# Patient Record
Sex: MALE | Race: BLACK OR AFRICAN AMERICAN
[De-identification: names, ages, dates, MRNs, and addresses within clinical notes are randomized per-mention and may not be internally consistent; named-entity substitution may affect disease eponyms.]

---

## 2018-06-18 NOTE — OR
DATE OF PROCEDURE:  06/15/2018

 

PREOPERATIVE DIAGNOSIS:  Morbid obesity.

 

POSTOPERATIVE DIAGNOSES:

1. Morbid obesity.

2. Marked hepatomegaly.

3. Paraesophageal diaphragmatic hernia.

4. Mediastinal lipoma.

 

OPERATIVE PROCEDURE:

1. Laparoscopic Jacob-en-Y gastric bypass along with gastroenterostomy (80730).

2. Hussein-Cut needle liver biopsy (35682).

3. Repair of paraesophageal diaphragmatic hernia (23463).

4. Excision of mediastinal lipoma (13608).

 

ANESTHESIA:  General.

 

ASSISTANTS:  Yanet Powell PA-C and MARILYN Greenberg

 

INDICATION FOR PROCEDURE:  This is a 62-year-old male presenting with longstanding morbid

obesity and increasingly significant comorbidities.  After preoperative evaluation and

discussion, he wished to proceed with a gastric bypass procedure.  Potential risks including

bleeding, infection, leaks from various GI tract closures, problems with bowel obstruction

over time as well as possibility of cardiopulmonary, septic, or hemorrhagic complications

leading to death were all discussed, and the patient wishes to proceed.

 

DETAILS OF PROCEDURE:  The patient was taken to the operating room.  After general

endotracheal anesthesia was induced, he was placed in a lithotomy position.  An orogastric

tube was placed and the abdomen prepped and draped.  At 15 cm inferior, 5 cm left of xiphoid

process, a transverse incision was made and the peritoneal cavity entered under direct

vision with an Optiview trocar inflated to 15 mmHg pressure with CO2.  Laparoscope was

reinserted.  No underlying trocar insertion site injuries were seen.  Following this, 5

additional trocars were placed across the upper mid abdomen and general exploration was

undertaken.  The patient was noted to have marked hepatomegaly with the liver volume being

roughly 2 to 3 times normal and grossly fatty infiltrated.  Hussein-Cut needle biopsies were

obtained and the bleeding easily controlled with electrocautery.  Using direct visualization

of the needle and the correct location, bilateral subcostal transverse abdominis plane

blocks were placed and attention was then taken to formation of the gastric bypass.

 

The omentum was then divided in the midline up to the level of the transverse colon.  This

allowed identification of the small bowel to the ligament of Treitz.  Small bowel was then

traced out 200 cm distal to that point, was divided transversely with a YESENIA stapler.  Small

bowel was then traced out an additional 200 cm where the side-to-side enteroenterostomy

accomplished with internal firing of the Endo-YESENIA 60 mm stapler.  The common opening was

then closed transversely with the same stapler and the angles anastomosed, and mesenteric

defect approximated with some 0 Ethibond sutures, reinforced with fibrin sealant.  The Jacob

limb was brought through an antecolic antegastric approach up to the level of the

gastroesophageal junction without tension.

 

The liver was then retracted anteriorly.  The patient was noted to have a moderate-sized

paraesophageal diaphragmatic hernia containing some perigastric fat and gastric fundus in

the plane anterior to the course of the remaining esophagus.  This was reduced and

peritoneum incised and reflected downward.  The patient was noted to have a mediastinal

lipoma in the plane of dissection, which was removed as well to facilitate a more

satisfactory closure and the anterior repair of the diaphragmatic hernia was then

accomplished with 0 Ethibond sutures reinforced with PTFE pledgets.

 

The gastrointestinal balloon catheter was then inflated to 15 mL and pulled up snugly

against the EG junction.  Gastric wall over the apex balloon was then marked with

electrocautery, and balloon catheter deflated and pulled up from the esophagus.  The lesser

omental tissue adjacent to the gastric cardia was incised following dissection behind the

stomach at the level of the cauterized nehemiah in the gastric cardia.  Pouch formation was

initiated with a transverse firing of the YESENIA black load, the second firing was noted up to

and through the angle of His for black loads, remaining firings up to and through the angle

of His for purple loads.  Upon completion of the pouch, both staple lines were noted to be

intact.  The anvil of the 25-mm EEA stapler was attached to a Wilson sump type tube, the

latter was brought down through the mouth and taken out through a small opening in the

gastric pouch allowing the anvil likewise to be pulled down to within the gastric pouch.

The divided end of the Jacob limb was then opened and the main body of the EEA stapler passed

several centimeters in the lumen of the small bowel, brought up the anvil, united with it,

thus creating a gastrojejunostomy.  Upon removal of the stapler, double donuts of mucosa

were noted within it.  The small bowel was closed off with a vascular staple line.

Gastrojejunostomy was reinforced with some 3-0 Vicryl seromuscular stitch along with fibrin

sealant.  Leak test was accomplished with injection of 120 mL of air in the gastric pouch

while submerged in cefoxitin-containing saline.  No leaks were identified.  A single Boone-

Foote drain was taken out through the left lateral trocar site and positioned adjacent to

the gastric cardia and the remaining trocars were removed and peritoneal cavity deflated.

Incisions were closed with 4-0 Vicryl skin stitch, which was also used to fix the drain.

The patient was taken to the recovery room in satisfactory condition.  There were no evident

complications.

 

Physician assistant, Yanet Powell, played an essential role in assisting in this case,

helping to position the patient, retract structures as needed, as well as suturing and

cutting sutures when indicated.  Her presence improved patient safety and decreased the

operative time.

 

 

 

 

Danis Gay MD

DD:  06/18/2018 13:17:31

DT:  06/18/2018 16:03:18

Job #:  [id number]

## 2018-06-18 NOTE — DISCH
ADMISSION DIAGNOSES:

1. Morbid obesity.

2. Unspecified polyarthropathy and polyarthritis.

3. Hidradenitis.

4. Adjustment disorder with mixed anxiety and depressed mood.

5. Chronic alcohol abuse, in remission.

6. Personality disorder.

7. Essential hypertension.

8. Nocturia.

9. Vitamin D deficiency.

10.Obstructive sleep apnea "treatment.".

 

DISCHARGE DIAGNOSES:  Laparoscopic gastric bypass surgery, liver biopsy, repair of

diaphragmatic hernia, and excision of mediastinal lipoma for morbid obesity, hepatomegaly,

diaphragmatic hernia, and mediastinal lipoma.  Date of surgery, 06/15/2018.

 

HISTORY:  Julio Medel is a 62-year-old male with longstanding history of morbid obesity and

increasing comorbidities.  After preoperative evaluation and discussion of possible risks

and possible complications, he wished to proceed with surgical procedure.

 

HOSPITAL COURSE:  Julio had his surgery on 06/15/2018.  He had no operative complications.

On postoperative day #1, he was started on step-2 gastric bypass diet without cereal and he

was started on his home medications.  On 06/17/2018, he started to have bloody stools.  His

CBC was checked, he was given factor VII, and they seemed to slow down.  On postoperative

day #3, he did have, prior to discharge, one large maroon stool.  Hemoglobin on the day of

discharge was 9.5 and hematocrit 30.3.  Vital signs were stable.  He received dietary

instruction.  He was able to be discharged to home.

 

PHYSICAL EXAMINATION:

GENERAL:  Julio Medel is a 62-year-old male.

VITAL SIGNS:  Height is 5 feet 7.5 inches.  Weight is 357 pounds.  BMI is 55.  TPR 96.1, 91,

18, and blood pressure 129/73.

HEENT:  Negative.

NECK:  Supple.

HEART:  Regular rate and rhythm.

LUNGS:  Clear.

ABDOMEN:  Sutures intact.  EVGENY drain removed.  Abdominal binder is on.

EXTREMITIES:  Revealed bilateral peripheral edema.

 

DISPOSITION:  Discharged to home.

 

CONDITION:  Stable and improving.

 

FOLLOWUP APPOINTMENT:  Yanet Powell PA-C, on 06/22/2018 at 10 a.m.  Hemoglobin at 9:30

before appointment.

 

DISCHARGE MEDICATIONS:  Home Medications:

1. Tylenol 650 mg q.6 hours.

2. Discontinue Celebrex.

3. Albuterol inhaler 2 puffs 4 times a day.

4. __________ 10 mg oral daily.

5. Avodart 0.5 mg oral daily.

6. Lasix 80 mg oral daily p.r.n. for edema.

7. Gabapentin 600 three times a day.

8. Lorcet 10/650, take one every 4 hours p.r.n. pain.

9. Cozaar, losartan, 50 mg oral daily.

10.Metoprolol- mg daily.

11.Viagra 100 mg p.r.n.

12.VESIcare 10 mg oral daily.

13.Triamcinolone 0.1% cream use 3 times a day to affected area.

14.Bupropion 100 mg oral daily.

15.Benadryl 50 mg oral q.6 hours.

16.Discontinue taking tramadol while on Norco, vitamin D3, and Celebrex.

 

DISCHARGE DIET:  Step-2 gastric bypass diet with no cereal until 07/02/2018.

 

ACTIVITY:  No lifting greater than 10 pounds for 2 weeks and then as tolerated.  Other

activity, walk 6 times daily inside your house.  Driving after discharge, do not drive for 2

weeks.  Shower/bathing, may shower.

 

DISCHARGE INSTRUCTIONS:  Notify provider of fever, nausea, or vomiting.  Keep site clean and

dry.  Use incentive spirometer 10 times every hour while awake.

## 2018-06-18 NOTE — CR
UGI wo KUB

 

HISTORY: eval R -Y GBP 

 

FINDINGS: After administration of oral contrast, upright views were obtained. Post operative changes 
gastric bypass. Surgical drains in place. No evidence for leak. There is some distention of small bow
el in the left abdomen. This may represent postop ileus.

 

 

IMPRESSION: No evidence for leak 

 Small bowel distention may represent ileus. Short-term follow-up recommended

## 2018-06-18 NOTE — PN
DATE OF SERVICE:  06/16/2018

 

The patient has been afebrile with stable vital signs.  No major problems noted overnight.

Oral intake was fairly good.  His urination appears to be satisfactory.  Upper GI shows a

little bit slow flow through the small bowel, but otherwise no problems were noted.  We will

go up to step-2 diet without solids today.  Restart his pertinent oral medications with some

additional inhalers, maximizing activity, and work with pulmonary toilet.

 

 

 

 

Danis Gay MD

DD:  06/16/2018 07:41:22

DT:  06/17/2018 13:07:02

Job #:  [id number]

## 2018-06-18 NOTE — PN
DATE OF SERVICE:  06/17/2018

 

The patient has been overall stable with relatively good oral intake.  He did have some

bloody bowel movements this morning.  I suspect this may be some old blood that occurred

immediately postoperatively.  He ___________ about the same, and his heart rates are in the

109 range, but that is what it has been running more or less throughout the entire

postoperative period.  We did give him a dose of activated factor VII, and we will recheck

the hemoglobin at 1400 hours, with the hemoglobin this morning being 10.5.  Heparin will be

held as well.  Otherwise, assuming that no further major problems occur, he will likely be

ready for discharge home tomorrow.  It would be good to have Dietary review the

postoperative gastric bypass diet regimen with the patient, as well as his attendant, who is

Eve Helm.

 

 

 

 

Danis Gay MD

DD:  06/17/2018 07:58:12

DT:  06/17/2018 12:07:17

Job #:  [id number]

## 2019-07-29 NOTE — CRLCT
INDICATION: Head trauma



TECHNIQUE: CT Head without i.v. contrast.



COMPARISON: 6/30/16



FINDINGS: 



CSF space: The ventricles are normal for age. 



Brain: No evidence of mass, acute infarction or hemorrhage is seen. No 

mass-effect or midline shift is seen. The brain parenchyma is otherwise 

normal in appearance with preservation of the gray-white matter junction. 



Calvarium: The visualized paranasal sinuses are well aerated. The mastoid 

air cells are clear. The visualized orbits are grossly unremarkable. The 

calvarium is unremarkable in appearance with no fractures identified. A 

moderate left occipital scalp hematoma is noted. 



IMPRESSION: 



1. No evidence of acute infarction, intracranial hemorrhage, or mass-effect 

seen. 



Please note that all CT scans at this facility use dose modulation, 

iterative reconstruction, and/or weight-based dosing when appropriate to 

reduce radiation dose to as low as reasonably achievable.



Dictated by: Omar Dong MD @ 07/29/2019 23:19:15



(Electronically Signed)

## 2020-04-26 ENCOUNTER — HOSPITAL ENCOUNTER (EMERGENCY)
Dept: HOSPITAL 11 - JP.ED | Age: 65
Discharge: HOME | End: 2020-04-26
Payer: MEDICARE

## 2020-04-26 VITALS — HEART RATE: 101 BPM | SYSTOLIC BLOOD PRESSURE: 156 MMHG | DIASTOLIC BLOOD PRESSURE: 86 MMHG

## 2020-04-26 DIAGNOSIS — I10: ICD-10-CM

## 2020-04-26 DIAGNOSIS — F17.210: ICD-10-CM

## 2020-04-26 DIAGNOSIS — K43.9: Primary | ICD-10-CM

## 2020-04-26 DIAGNOSIS — M19.90: ICD-10-CM

## 2020-04-26 DIAGNOSIS — Z79.899: ICD-10-CM

## 2020-04-26 DIAGNOSIS — E66.9: ICD-10-CM

## 2020-04-26 NOTE — EDM.PDOC
ED HPI GENERAL MEDICAL PROBLEM





- General


Chief Complaint: Abdominal Pain


Stated Complaint: ABDOMINAL PAIN


Time Seen by Provider: 04/26/20 20:25


Source of Information: Reports: Patient


History Limitations: Reports: No Limitations





- History of Present Illness


INITIAL COMMENTS - FREE TEXT/NARRATIVE: 





Pt arrived with a bulge just above the umbilicus. Pt was not able to reduce it. 

This did happen once in the past but it went back by itself. It had been out 

about 1.5 hours. This was very painful. c


Onset: Today, Sudden


Duration: Hour(s):


Location: Reports: Abdomen


Associated Symptoms: Reports: Other ( severe pain Pt had no vomiting. )


  ** Abdomen


Pain Score (Numeric/FACES): 10





- Related Data


 Allergies











Allergy/AdvReac Type Severity Reaction Status Date / Time


 


No Known Allergies Allergy   Verified 07/29/19 22:49











Home Meds: 


 Home Meds





Hydrocodone/Acetaminophen [Lorcet ] 10 - 325 mg PO Q4H PRN 03/17/14 [

History]


Sildenafil [Viagra] 100 mg PO BEDTIME PRN 03/17/14 [History]


Solifenacin [Vesicare] 10 mg PO DAILY 03/17/14 [History]


Triamcinolone Acetonide [Kenalog 0.1% Crm] 15 gm TRDERM TID 03/17/14 [History]


Furosemide [Lasix] 80 mg PO DAILY PRN 05/09/14 [History]


Gabapentin [Neurontin] 600 mg PO TID 06/30/16 [History]


Losartan [Cozaar] 50 mg PO DAILY 06/30/16 [History]


diphenhydrAMINE [Benadryl] 50 mg PO Q6H PRN 06/30/16 [History]


Albuterol Sulfate [Proair Hfa] 2 puff PO QID 06/14/18 [History]


Alfuzosin [Uroxatral] 10 mg PO DAILY 06/14/18 [History]


Metoprolol Succinate [Toprol XL 100mg] 100 mg PO DAILY 06/14/18 [History]


buPROPion [buPROPion XL] 150 mg PO DAILY 06/14/18 [History]


Celecoxib [CeleBREX] 200 mg PO DAILY@0800  cap 06/18/18 [Rx]


Potassium Chloride [Klor-Con] 40 meq PO BID 07/29/19 [History]


Valsartan 1 tab PO DAILY 07/29/19 [History]


traMADol HCl [Tramadol HCl] 1 tab PO Q6H PRN 07/29/19 [History]











Past Medical History


HEENT History: Reports: Impaired Vision, Other (See Below)


Other HEENT History: near sighted


Cardiovascular History: Reports: Hypertension


Respiratory History: Reports: SOB


Genitourinary History: Reports: Prostate Disorder


Musculoskeletal History: Reports: Arthritis, Neck Pain, Chronic, Other (See 

Below)


Other Musculoskeletal History: bilteral shoulder pain


Endocrine/Metabolic History: Reports: Obesity/BMI 30+





- Infectious Disease History


Infectious Disease History: Reports: Chicken Pox





- Past Surgical History


Cardiovascular Surgical History: Reports: None


Respiratory Surgical History: Reports: None


GI Surgical History: Reports: Bariatric Procedure, Hernia Repair/Other


Musculoskeletal Surgical History: Reports: Arthroscopic Knee





Social & Family History





- Tobacco Use


Smoking Status *Q: Current Every Day Smoker


Years of Tobacco use: 5


Packs/Tins Daily: 0.5





- Caffeine Use


Caffeine Use: Reports: None





- Alcohol Use


Date of Last Drink: 04/26/20





- Recreational Drug Use


Recreational Drug Use: No





ED ROS GENERAL





- Review of Systems


Review Of Systems: See Below


Constitutional: Reports: No Symptoms


HEENT: Reports: No Symptoms


Respiratory: Reports: No Symptoms


Cardiovascular: Reports: No Symptoms


Endocrine: Reports: No Symptoms


GI/Abdominal: Reports: Abdominal Pain, Other ( Pt has a bulge just above the 

umbilicus that would not go back in, This was very painful. )


: Reports: No Symptoms


Musculoskeletal: Reports: No Symptoms


Skin: Reports: No Symptoms





ED EXAM, GI/ABD





- Physical Exam


Exam: See Below


Text/Narrative:: 





pt arrived with pain in the abdoman and he had a definite bulge. With some 

pressure this was reduced.  His pain went away immediately. 


Exam Limited By: No Limitations


General Appearance: Alert, Anxious, Moderate Distress


Ears: Normal TMs


Nose: Normal Inspection


Throat/Mouth: Normal Inspection


Head: Atraumatic


Neck: Normal Inspection


Respiratory/Chest: No Respiratory Distress


Cardiovascular: Regular Rate, Rhythm


GI/Abdominal Exam: Other (pt had a ventral hernia above umbilcus This was 

pushed on and it did reduce. His pain did go away immediately. )


 (Male) Exam: Deferred


Rectal (Males) Exam: Deferred


Back Exam: Normal Inspection


Extremities: Normal Inspection


Neurological: Alert, Oriented, Normal Cognition





Course





- Vital Signs


Last Recorded V/S: 


 Last Vital Signs











Temp  36.3 C   04/26/20 19:49


 


Pulse  101 H  04/26/20 19:49


 


Resp  20   04/26/20 19:49


 


BP  156/86 H  04/26/20 19:49


 


Pulse Ox  100   04/26/20 19:49














- Re-Assessments/Exams


Free Text/Narrative Re-Assessment/Exam: 





04/26/20 20:32


umbilical hernia was reduced





Departure





- Departure


Time of Disposition: 20:27


Disposition: Home, Self-Care 01


Condition: Fair


Clinical Impression: 


 Ventral hernia








- Discharge Information


Instructions:  Hernia, Adult


Referrals: 


Fan Lundberg MD [Primary Care Provider] - 


Forms:  ED Department Discharge


Care Plan Goals: 


appt with Dr Gay in next 3 days to see if he feels this should  repaired. rtc 

if this should bulge out again. 





Sepsis Event Note





- Evaluation


Sepsis Screening Result: No Definite Risk





- Focused Exam


Vital Signs: 


 Vital Signs











  Temp Pulse Resp BP Pulse Ox


 


 04/26/20 19:49  36.3 C  101 H  20  156/86 H  100











Date Exam was Performed: 04/26/20


Time Exam was Performed: 20:29

## 2020-04-30 ENCOUNTER — HOSPITAL ENCOUNTER (OUTPATIENT)
Dept: HOSPITAL 11 - JP.SDS | Age: 65
LOS: 1 days | Discharge: HOME | End: 2020-05-01
Attending: SURGERY
Payer: MEDICARE

## 2020-04-30 DIAGNOSIS — G89.29: ICD-10-CM

## 2020-04-30 DIAGNOSIS — E66.9: ICD-10-CM

## 2020-04-30 DIAGNOSIS — N40.1: ICD-10-CM

## 2020-04-30 DIAGNOSIS — F41.9: ICD-10-CM

## 2020-04-30 DIAGNOSIS — I10: ICD-10-CM

## 2020-04-30 DIAGNOSIS — Z79.899: ICD-10-CM

## 2020-04-30 DIAGNOSIS — Z98.84: ICD-10-CM

## 2020-04-30 DIAGNOSIS — Z87.891: ICD-10-CM

## 2020-04-30 DIAGNOSIS — K43.6: Primary | ICD-10-CM

## 2020-04-30 DIAGNOSIS — M06.9: ICD-10-CM

## 2020-04-30 DIAGNOSIS — K42.9: ICD-10-CM

## 2020-04-30 DIAGNOSIS — F32.9: ICD-10-CM

## 2020-04-30 LAB — HBA1C BLD-MCNC: 5.9 % (ref 4.5–6.2)

## 2020-04-30 PROCEDURE — 51701 INSERT BLADDER CATHETER: CPT

## 2020-04-30 PROCEDURE — 94640 AIRWAY INHALATION TREATMENT: CPT

## 2020-04-30 PROCEDURE — C1713 ANCHOR/SCREW BN/BN,TIS/BN: HCPCS

## 2020-04-30 PROCEDURE — 83036 HEMOGLOBIN GLYCOSYLATED A1C: CPT

## 2020-04-30 PROCEDURE — 88302 TISSUE EXAM BY PATHOLOGIST: CPT

## 2020-04-30 PROCEDURE — 84100 ASSAY OF PHOSPHORUS: CPT

## 2020-04-30 PROCEDURE — 36415 COLL VENOUS BLD VENIPUNCTURE: CPT

## 2020-04-30 PROCEDURE — 49653: CPT

## 2020-04-30 PROCEDURE — 84630 ASSAY OF ZINC: CPT

## 2020-04-30 PROCEDURE — 82728 ASSAY OF FERRITIN: CPT

## 2020-04-30 PROCEDURE — 44700 SUSPEND BOWEL W/PROSTHESIS: CPT

## 2020-04-30 PROCEDURE — C1781 MESH (IMPLANTABLE): HCPCS

## 2020-04-30 PROCEDURE — 82746 ASSAY OF FOLIC ACID SERUM: CPT

## 2020-04-30 PROCEDURE — 94762 N-INVAS EAR/PLS OXIMTRY CONT: CPT

## 2020-04-30 PROCEDURE — 83735 ASSAY OF MAGNESIUM: CPT

## 2020-04-30 PROCEDURE — 80053 COMPREHEN METABOLIC PANEL: CPT

## 2020-04-30 PROCEDURE — 49652: CPT

## 2020-04-30 PROCEDURE — 84425 ASSAY OF VITAMIN B-1: CPT

## 2020-04-30 PROCEDURE — 82607 VITAMIN B-12: CPT

## 2020-04-30 PROCEDURE — 82525 ASSAY OF COPPER: CPT

## 2020-04-30 PROCEDURE — 84590 ASSAY OF VITAMIN A: CPT

## 2020-05-01 VITALS — DIASTOLIC BLOOD PRESSURE: 104 MMHG | SYSTOLIC BLOOD PRESSURE: 142 MMHG

## 2020-05-01 VITALS — HEART RATE: 85 BPM

## 2020-05-01 RX ADMIN — HYDROCODONE BITARTRATE AND ACETAMINOPHEN PRN TAB: 5; 325 TABLET ORAL at 13:11

## 2020-05-01 RX ADMIN — HYDROCODONE BITARTRATE AND ACETAMINOPHEN PRN TAB: 5; 325 TABLET ORAL at 08:17

## 2020-05-01 NOTE — PN
DATE OF SERVICE:  05/01/2020

 

SUBJECTIVE:  Julio is postoperative day #1.  He states his pain is controlled.  He has been

afebrile, up, ambulating.  He was not able to void postop.  He did have a straight cath with

400 mL and he has not voided since.  He plans to go home as soon as he can urinate.

 

REVIEW OF SYSTEMS:  Remainder of review of systems negative for any pertinent positives and

negatives.

 

OBJECTIVE:  GENERAL:  Julio Medel is a 64-year-old male.

VITAL SIGNS:  TPR at 07:03, 97.8; 83; 16; blood pressure 152/107.

HEENT:  Negative.

NECK:  Supple.

HEART:  Regular rate and rhythm.

LUNGS:  Clear.

ABDOMEN:  Dressings dry and intact.  Abdominal binder is on.

EXTREMITIES:  Without peripheral edema.

 

ASSESSMENT:  Laparoscopic repair of incarcerated umbilical hernia and epigastric hernia with

mesh for separate umbilical hernia and epigastric hernia.  Date of surgery:  04/30/2020.

Surgeon:  Dnais Gay MD.

 

PLAN:

1. Bladder scan now, then after voiding post bladder scan.  If 200 or below, may discharge

    to home.

2. We will evaluate p.r.n.

3. The patient is planning to be discharged today.

 

 

 

 

Yanet Powell PA-C

DD:  05/01/2020 07:53:24

DT:  05/01/2020 09:33:06

Job #:  [id number]

## 2020-05-10 NOTE — OR
DATE OF PROCEDURE:  04/30/2020

 

SURGEON:  Danis Gay MD

 

PREOPERATIVE DIAGNOSIS:  Incarcerated epigastric hernia.

 

POSTOPERATIVE DIAGNOSES:

1. Incarcerated epigastric hernia.

2. Separate non-incarcerated umbilical hernia.

 

OPERATIVE PROCEDURES:  Diagnostic laparoscopy with:

1. Repair of incarcerated epigastric hernia with mesh (44982).

2. Repair of separate non-incarcerated umbilical hernia with mesh (88876).

3. Placement of Interceed mesh x2 to limit adhesion formation between pelvic and abdominal

    wall and underlying viscera (72694).

 

ANESTHESIA:  General.

 

ASSISTANT:  Yanet Powell PA-C

 

INDICATION FOR PROCEDURE:  This is a 64-year-old male presenting with an incarcerated

epigastric hernia.  He was seen in the emergency room 2 days ago and at that time had quite

a bit of pain, and the hernia was partially reduced.  He still has some incarcerated

component within it.  Plan to proceed with diagnostic laparoscopy with laparotomy if

necessary and repair of this with mesh.  Potential risks including bleeding, infection,

injury to underlying viscera, problems with mesh becoming infected and the hernia recurring

were all reviewed, and the patient wishes to proceed.

 

DETAILS OF PROCEDURE:  The patient was taken to the operating room and placed in a supine

position after general endotracheal anesthesia was induced.  A Duffy catheter was inserted,

and the abdomen was prepped and draped.

 

In the left lateral abdomen, a transverse incision was made, and the peritoneal cavity

entered under direct vision with an Optiview trocar, inflated to 15 mmHg pressure with CO2.

Laparoscope was reinserted.  No underlying trocar insertion site injuries were seen.  5 mm

trocars were then placed in the left lower quadrant and left upper quadrant, and the abdomen

examined.  The patient was noted to have some incarcerated omentum within the epigastric

hernia site.  There was also a smaller non-incarcerated umbilical hernia somewhat below

that.  At this point, the hernia contents and the epigastric site were reduced with a

combination of external pressure and Harmonic scalpel dissection and that area was then

freed up of the hernia sac.  The abdomen was then marked out, and a Ventralight mesh with

balloon positioning system was then selected.  We elected to use two 15 cm meshes, one from

each hernia, which would provide a better coverage with less overall mesh on the inner

surface of the abdomen at each location, and the mesh was soaked in antibiotic-containing

saline solution, applied to the abdomen, and each site then sequentially pulled up,

inflated, and the mesh circumferentially affixed with absorbable tacking screws in each

case.  Then, the balloons were deflated and removed.  Good fixation was evident and there

was good coverage of the hernia defects at both locations.

 

To limit recurrent adhesion formation between the mesh and the remainder of the abdominal

and pelvic wall, two Interceed meshes were then placed underneath the mesh and extending

down onto the pelvic wall.  Once these were in place, the trocars were removed.  Fascia at

the 12 mm site was closed with 0 Vicryl stitch and the skin with 4-0 Vicryl skin stitch.

Dressing was applied.  The patient was taken to the recovery room in satisfactory condition.

 

Physician assistant, Yanet Powell PA-C, played an essential role in assisting in this case,

helping to position the patient, and retract structures as needed as well as suturing and

cutting sutures when indicated.  Her presence improved patient safety and decreased the

operative time.

 

 

 

 

Danis Gay MD

DD:  05/09/2020 19:02:17

DT:  05/10/2020 11:01:36

Job #:  [id number]

## 2020-10-15 ENCOUNTER — HOSPITAL ENCOUNTER (EMERGENCY)
Dept: HOSPITAL 11 - JP.ED | Age: 65
Discharge: HOME | End: 2020-10-15
Payer: MEDICARE

## 2020-10-15 VITALS — HEART RATE: 58 BPM | DIASTOLIC BLOOD PRESSURE: 50 MMHG | SYSTOLIC BLOOD PRESSURE: 89 MMHG

## 2020-10-15 DIAGNOSIS — I11.0: Primary | ICD-10-CM

## 2020-10-15 DIAGNOSIS — F43.23: ICD-10-CM

## 2020-10-15 DIAGNOSIS — H55.89: ICD-10-CM

## 2020-10-15 DIAGNOSIS — F60.9: ICD-10-CM

## 2020-10-15 DIAGNOSIS — I48.11: ICD-10-CM

## 2020-10-15 DIAGNOSIS — Z79.899: ICD-10-CM

## 2020-10-15 DIAGNOSIS — I95.2: ICD-10-CM

## 2020-10-15 DIAGNOSIS — I50.810: ICD-10-CM

## 2020-10-15 DIAGNOSIS — E66.9: ICD-10-CM

## 2020-10-15 DIAGNOSIS — Z98.890: ICD-10-CM

## 2020-10-15 DIAGNOSIS — E83.51: ICD-10-CM

## 2020-10-15 PROCEDURE — 83880 ASSAY OF NATRIURETIC PEPTIDE: CPT

## 2020-10-15 PROCEDURE — 96365 THER/PROPH/DIAG IV INF INIT: CPT

## 2020-10-15 PROCEDURE — 85610 PROTHROMBIN TIME: CPT

## 2020-10-15 PROCEDURE — 70450 CT HEAD/BRAIN W/O DYE: CPT

## 2020-10-15 PROCEDURE — 84484 ASSAY OF TROPONIN QUANT: CPT

## 2020-10-15 PROCEDURE — 93005 ELECTROCARDIOGRAM TRACING: CPT

## 2020-10-15 PROCEDURE — 80307 DRUG TEST PRSMV CHEM ANLYZR: CPT

## 2020-10-15 PROCEDURE — 80053 COMPREHEN METABOLIC PANEL: CPT

## 2020-10-15 PROCEDURE — 99285 EMERGENCY DEPT VISIT HI MDM: CPT

## 2020-10-15 PROCEDURE — 36415 COLL VENOUS BLD VENIPUNCTURE: CPT

## 2020-10-15 PROCEDURE — 85025 COMPLETE CBC W/AUTO DIFF WBC: CPT

## 2020-10-15 PROCEDURE — 85730 THROMBOPLASTIN TIME PARTIAL: CPT

## 2020-10-15 PROCEDURE — 83605 ASSAY OF LACTIC ACID: CPT

## 2020-10-15 NOTE — EDM.PDOC
ED HPI GENERAL MEDICAL PROBLEM





- General


Chief Complaint: Neurological Problem


Stated Complaint: MEDICAL VIA NORTH


Time Seen by Provider: 10/15/20 02:15


Source of Information: Reports: EMS


History Limitations: Reports: Altered Mental Status





- History of Present Illness


INITIAL COMMENTS - FREE TEXT/NARRATIVE: 


Julio is a 65-year-old male presenting to the ED with unknown circumstances but

apparently was found unresponsive at his apartment.  It is unknown who contacted

911, however, when EMS arrived at his apartment they found him to be minimally 

responsive but combative when they tried to move him.  The patient was also 

found to be hypotensive but had a normal blood glucose.  The patient had a 

similar episode in 2019 where he presented hypotensive, altered mental status, 

in atrial fibrillation after having used methamphetamine.  The patient has 

remained hypotensive in the emergency room prompting a rapid assessment and rule

out of an intracranial bleed.





Duration: Other (Unknown)





- Related Data


                                    Allergies











Allergy/AdvReac Type Severity Reaction Status Date / Time


 


No Known Allergies Allergy   Verified 10/15/20 02:44











Home Meds: 


                                    Home Meds





Hydrocodone/Acetaminophen [Lorcet ] 10 - 325 mg PO Q4H PRN 03/17/14 

[History]


Sildenafil [Viagra] 100 mg PO BEDTIME PRN 03/17/14 [History]


Solifenacin [Vesicare] 10 mg PO DAILY 03/17/14 [History]


Triamcinolone Acetonide [Kenalog 0.1% Crm] 15 gm TRDERM TID 03/17/14 [History]


Gabapentin [Neurontin] 600 mg PO TID 06/30/16 [History]


Losartan [Cozaar] 50 mg PO DAILY 06/30/16 [History]


Albuterol Sulfate [Proair Hfa] 2 puff PO QID 06/14/18 [History]


Alfuzosin [Uroxatral] 10 mg PO DAILY 06/14/18 [History]


Metoprolol Succinate [Toprol XL 100mg] 100 mg PO DAILY 06/14/18 [History]


Celecoxib [CeleBREX] 200 mg PO DAILY@0800  cap 06/18/18 [Rx]


Potassium Chloride [Klor-Con] 20 meq PO BID 07/29/19 [History]


Valsartan 1 tab PO DAILY 07/29/19 [History]


traMADol HCl [Tramadol HCl] 1 tab PO Q6H PRN 07/29/19 [History]


Calcium Citrate/Vitamin D3 [Calcium Citrate + D] 2 each PO DAILY 04/28/20 

[History]


Cholecalciferol (Vitamin D3) [Vitamin D] 5,000 unit PO DAILY 04/28/20 [History]


Dutasteride [Avodart] 0.5 mg PO DAILY 04/28/20 [History]


Ferrous Fumarate/Vitamin C [Vitron-C] 1 tab PO DAILY 04/28/20 [History]


Folic Acid/Multivit-Min/Lutein [Multi-Vitamin Gummies] 1 each PO BID 04/28/20 

[History]


Furosemide [Lasix] 40 mg PO DAILY PRN 04/28/20 [History]


Thiamine Mononitrate (Vit B1) [Vitamin B-1] 100 mg PO DAILY 04/28/20 [History]


Vitamin A 10,000 unit PO DAILY 04/28/20 [History]











Past Medical History


HEENT History: Reports: Impaired Vision, Other (See Below)


Other HEENT History: near sighted


Cardiovascular History: Reports: Hypertension


Respiratory History: Reports: SOB


Genitourinary History: Reports: Prostate Disorder


Musculoskeletal History: Reports: Arthritis, Neck Pain, Chronic, Other (See 

Below)


Other Musculoskeletal History: bilteral shoulder pain


Endocrine/Metabolic History: Reports: Obesity/BMI 30+





- Infectious Disease History


Infectious Disease History: Reports: Chicken Pox, Measles





- Past Surgical History


Cardiovascular Surgical History: Reports: None


Respiratory Surgical History: Reports: None


GI Surgical History: Reports: Bariatric Procedure, Colostomy, Hernia 

Repair/Other


Musculoskeletal Surgical History: Reports: Arthroscopic Knee





Social & Family History





- Family History


Family Medical History: Noncontributory





- Caffeine Use


Caffeine Use: Reports: Soda





ED ROS GENERAL





- Review of Systems


Review Of Systems: Unable To Obtain (Patient unresponsive for the most part.  He

 only answers question when stimulated and only when he wants to.)


Reason Not Obtained: Altered mental status





- Physical Exam


Exam: See Below


Exam Limited By: Altered Mental Status


General Appearance: Obtunded


Eye Exam: Bilateral Eye: Abnormal EOM (Disconjugate gaze), PERRL (Pupils are 3 

to 4 mm and sluggish)


Throat/Mouth: Normal Inspection, Normal Lips, Normal Oropharynx


Head Exam: Atraumatic, Normocephalic


Neck: Normal Inspection, Non-Tender, Full Range of Motion


Respiratory/Chest: No Respiratory Distress, Lungs Clear, Normal Breath Sounds, 

No Accessory Muscle Use, Chest Non-Tender


Cardiovascular: Normal Peripheral Pulses, No Edema, No JVD, No Murmur, No Rub, 

Irregularly Irregular


GI/Abdominal: Normal Bowel Sounds, Soft, Non-Tender, No Organomegaly, No 

Distention


Neuro Exam (Abbreviated): Inattentive, Confused, Slow to Respond


Back Exam: Normal Inspection, Full Range of Motion


Extremities: Normal Range of Motion, Non-Tender, Pedal Edema (3+ pedal edema 

bilaterally to the knees)


Skin Exam: Warm, Dry


  ** #1 Interpretation


EKG Date: 10/15/20


Time: 02:43


Rhythm: A-Fib


Rate (Beats/Min): 85 (55 to 85 bpm)


QRS: Normal


ST-T: Other (Nonspecific ST-T changes in the inferior lateral leads)





Course





- Vital Signs


Last Recorded V/S: 


                                Last Vital Signs











Temp  36.2 C   10/15/20 03:11


 


Pulse  66   10/15/20 06:11


 


Resp  14   10/15/20 06:11


 


BP  71/41 L  10/15/20 06:11


 


Pulse Ox  99   10/15/20 06:11














- Orders/Labs/Meds


Orders: 


                               Active Orders 24 hr











 Category Date Time Status


 


 EKG Documentation Completion [RC] ASDIRECTED Care  10/15/20 02:18 Active


 


 DRUG SCREEN, URINE [URCHEM] Stat Lab  10/15/20 02:15 Ordered


 


 UA W/MICROSCOPIC [URIN] Stat Lab  10/15/20 02:15 Ordered


 


 Sodium Chloride 0.9% [Normal Saline] 1,000 ml Med  10/15/20 03:30 Active





 IV ASDIRECTED   


 


 EKG 12 Lead [EK] Routine Ther  10/15/20 02:15 Ordered








                                Medication Orders





Sodium Chloride (Normal Saline)  1,000 mls @ 150 mls/hr IV ASDIRECTED YVES


   Last Admin: 10/15/20 03:31  Dose: 150 mls/hr


   Documented by: JEAN








Labs: 


                                Laboratory Tests











  10/15/20 10/15/20 10/15/20 Range/Units





  02:20 02:20 02:20 


 


WBC  6.1    (4.5-11.0)  K/uL


 


RBC  5.27    (4.30-5.90)  M/uL


 


Hgb  14.2    (12.0-15.0)  g/dL


 


Hct  44.0    (40.0-54.0)  %


 


MCV  84    (80-98)  fL


 


MCH  27    (27-31)  pg


 


MCHC  32    (32-36)  %


 


Plt Count  178    (150-400)  K/uL


 


Neut % (Auto)  65    (36-66)  %


 


Lymph % (Auto)  23 L    (24-44)  %


 


Mono % (Auto)  10 H    (2-6)  %


 


Eos % (Auto)  1 L    (2-4)  %


 


Baso % (Auto)  1    (0-1)  %


 


PT   11.4   (9.5-12.0)  sec


 


INR   1.05   (0.80-1.20)  


 


APTT   24.0 L   (27.0-36.0)  sec


 


Sodium    143  (140-148)  mmol/L


 


Potassium    4.4  (3.6-5.2)  mmol/L


 


Chloride    108  (100-108)  mmol/L


 


Carbon Dioxide    25  (21-32)  mmol/L


 


Anion Gap    9.7  (5.0-14.0)  mmol/L


 


BUN    19 H  (7-18)  mg/dL


 


Creatinine    1.4 H D  (0.8-1.3)  mg/dL


 


Est Cr Clr Drug Dosing    55.76  mL/min


 


Estimated GFR (MDRD)    > 60  (>60)  


 


Glucose    124 H  ()  mg/dL


 


Lactic Acid     (0.4-2.0)  mmol/L


 


Calcium    7.9 L  (8.5-10.1)  mg/dL


 


Total Bilirubin    0.3  (0.2-1.0)  mg/dL


 


AST    15  (15-37)  U/L


 


ALT    24  (12-78)  U/L


 


Alkaline Phosphatase    81  ()  U/L


 


Troponin I     (0.000-0.056)  ng/mL


 


NT-Pro-B Natriuret Pep     (5-125)  pg/mL


 


Total Protein    5.8 L  (6.4-8.2)  g/dL


 


Albumin    2.7 L  (3.4-5.0)  g/dL


 


Globulin    3.1  (2.3-3.5)  g/dL


 


Albumin/Globulin Ratio    0.9 L  (1.2-2.2)  


 


Ethyl Alcohol     mg/dL














  10/15/20 10/15/20 10/15/20 Range/Units





  02:20 02:20 02:20 


 


WBC     (4.5-11.0)  K/uL


 


RBC     (4.30-5.90)  M/uL


 


Hgb     (12.0-15.0)  g/dL


 


Hct     (40.0-54.0)  %


 


MCV     (80-98)  fL


 


MCH     (27-31)  pg


 


MCHC     (32-36)  %


 


Plt Count     (150-400)  K/uL


 


Neut % (Auto)     (36-66)  %


 


Lymph % (Auto)     (24-44)  %


 


Mono % (Auto)     (2-6)  %


 


Eos % (Auto)     (2-4)  %


 


Baso % (Auto)     (0-1)  %


 


PT     (9.5-12.0)  sec


 


INR     (0.80-1.20)  


 


APTT     (27.0-36.0)  sec


 


Sodium     (140-148)  mmol/L


 


Potassium     (3.6-5.2)  mmol/L


 


Chloride     (100-108)  mmol/L


 


Carbon Dioxide     (21-32)  mmol/L


 


Anion Gap     (5.0-14.0)  mmol/L


 


BUN     (7-18)  mg/dL


 


Creatinine     (0.8-1.3)  mg/dL


 


Est Cr Clr Drug Dosing     mL/min


 


Estimated GFR (MDRD)     (>60)  


 


Glucose     ()  mg/dL


 


Lactic Acid  2.0    (0.4-2.0)  mmol/L


 


Calcium     (8.5-10.1)  mg/dL


 


Total Bilirubin     (0.2-1.0)  mg/dL


 


AST     (15-37)  U/L


 


ALT     (12-78)  U/L


 


Alkaline Phosphatase     ()  U/L


 


Troponin I     (0.000-0.056)  ng/mL


 


NT-Pro-B Natriuret Pep    1477 H  (5-125)  pg/mL


 


Total Protein     (6.4-8.2)  g/dL


 


Albumin     (3.4-5.0)  g/dL


 


Globulin     (2.3-3.5)  g/dL


 


Albumin/Globulin Ratio     (1.2-2.2)  


 


Ethyl Alcohol   < 3   mg/dL














  10/15/20 Range/Units





  02:20 


 


WBC   (4.5-11.0)  K/uL


 


RBC   (4.30-5.90)  M/uL


 


Hgb   (12.0-15.0)  g/dL


 


Hct   (40.0-54.0)  %


 


MCV   (80-98)  fL


 


MCH   (27-31)  pg


 


MCHC   (32-36)  %


 


Plt Count   (150-400)  K/uL


 


Neut % (Auto)   (36-66)  %


 


Lymph % (Auto)   (24-44)  %


 


Mono % (Auto)   (2-6)  %


 


Eos % (Auto)   (2-4)  %


 


Baso % (Auto)   (0-1)  %


 


PT   (9.5-12.0)  sec


 


INR   (0.80-1.20)  


 


APTT   (27.0-36.0)  sec


 


Sodium   (140-148)  mmol/L


 


Potassium   (3.6-5.2)  mmol/L


 


Chloride   (100-108)  mmol/L


 


Carbon Dioxide   (21-32)  mmol/L


 


Anion Gap   (5.0-14.0)  mmol/L


 


BUN   (7-18)  mg/dL


 


Creatinine   (0.8-1.3)  mg/dL


 


Est Cr Clr Drug Dosing   mL/min


 


Estimated GFR (MDRD)   (>60)  


 


Glucose   ()  mg/dL


 


Lactic Acid   (0.4-2.0)  mmol/L


 


Calcium   (8.5-10.1)  mg/dL


 


Total Bilirubin   (0.2-1.0)  mg/dL


 


AST   (15-37)  U/L


 


ALT   (12-78)  U/L


 


Alkaline Phosphatase   ()  U/L


 


Troponin I  < 0.017  (0.000-0.056)  ng/mL


 


NT-Pro-B Natriuret Pep   (5-125)  pg/mL


 


Total Protein   (6.4-8.2)  g/dL


 


Albumin   (3.4-5.0)  g/dL


 


Globulin   (2.3-3.5)  g/dL


 


Albumin/Globulin Ratio   (1.2-2.2)  


 


Ethyl Alcohol   mg/dL











Meds: 


Medications











Generic Name Dose Route Start Last Admin





  Trade Name Freq  PRN Reason Stop Dose Admin


 


Sodium Chloride  1,000 mls @ 150 mls/hr  10/15/20 03:30  10/15/20 03:31





  Normal Saline  IV   150 mls/hr





  ASDIRECTED YVES   Administration














Discontinued Medications














Generic Name Dose Route Start Last Admin





  Trade Name Freq  PRN Reason Stop Dose Admin


 


Sodium Chloride  1,000 mls @ 1,000 mls/hr  10/15/20 03:24  10/15/20 02:29





  Normal Saline  IV  10/15/20 04:23  1,000 mls/hr





  .BOLUS ONE   Administration


 


Sodium Chloride  1,000 mls @ 1,000 mls/hr  10/15/20 03:25  10/15/20 02:45





  Normal Saline  IV  10/15/20 04:24  1,000 mls/hr





  .BOLUS ONE   Administration


 


Calcium Gluconate 1 gm/ Sodium  110 mls @ 100 mls/hr  10/15/20 04:37  10/15/20 

05:00





  Chloride  IV  10/15/20 05:42  100 mls/hr





  ONETIME ONE   Administration














- Radiology Interpretation


Free Text/Narrative:: 


No acute abnormalities seen on his CT of the brain without contrast.





CT Results Date: 10/15/20


CT Results Time: 02:27





- Re-Assessments/Exams


Free Text/Narrative Re-Assessment/Exam: 





10/15/20 04:10 the patient continued to have a tenuous blood pressure course 

despite being alert although not cooperative.  We had to repeatedly informed the

 patient that he was in the emergency room and that he needed to lie down as he 

was attempting to get off the bed with a blood pressure of 75/41.  The patient 

did receive several liters of IV normal saline with minimal response to his 

blood pressure.  He has not yet provided us with a urine sample to see what 

possibly he may be on as far as prescribed and nonprescribed drugs.  The patient

 did present to the ED with a very large pill bottle for Lortab 10 mg / 325 mg 

for 180 tablets that was dispensed on 9/4/2020.  It appears that he has been 

taking on average 8 tablets a day instead of the 6/day that he is prescribed.  

He may have taken of substantial quantity of this tonight causing his altered 

mental status.  We are still awaiting the patient to provide us with urine to be

 able to determine this.  Narcan was available at the scene by EMS but they did 

not administer it.  Based on the risks of Narcan administration in this context,

 I will hold off on it tonight unless he becomes tenuous with respiratory drive.

    





10/15/20 04:56 I did prescribe calcium gluconate 1 g IV for his serum calcium 

was 7.9.  His corrected calcium is 8.5 which is at the bottom abdomen normal so 

he would probably benefit him of IV calcium gluconate.





10/15/20 05:21 BNP is elevated at 1477 which accounts for his 3+ pitting edema. 

 He likely is in congestive heart failure secondary to atrial fibrillation as 

well as fluid retention.  Systemically, however, he appears to be dry with an 

elevated BUN and creatinine so I would be reluctant to put him on diuretics in 

his current state.





10/15/20 05:34 troponin I is normal at less than 0.017.  The patient is still 

not provided us with a urine for urinalysis and urine drug screen.  His blood 

pressure remains at 76/45 which is similar to when he presented on 7/19/2019.











10/15/20 06:54 the patient received the calcium gluconate 1 g with improvement 

in his blood pressure which is now 89/50.  His heart rate is still 65-72 in 

atrial fibrillation.  He still has not provided us any urine and likely will 

not.  At this time I feel that he is improved enough where he can be safely 

discharged into the care of another adult.  The patient's friend has been co

ntacted and is agreeable to keep an eye on him today.  Indications to return to 

the ED were discussed.








Departure





- Departure


Time of Disposition: 07:00


Disposition: Home, Self-Care 01


Condition: Fair


Clinical Impression: 


 Hypocalcemia, Dysconjugate gaze, Chronic prescription opiate use, Peripheral 

edema, Personality disorder, Status post gastric bypass for obesity, Status post

 repair of ventral hernia, Pain management contract agreement, Adjustment diso

rder with mixed anxiety and depressed mood





Altered mental status, unspecified


Qualifiers:


 Altered mental status type: unspecified Qualified Code(s): R41.82 - Altered 

mental status, unspecified





Hypotension


Qualifiers:


 Hypotension type: hypotension due to drug Qualified Code(s): I95.2 - 

Hypotension due to drugs





Atrial fibrillation


Qualifiers:


 Atrial fibrillation type: longstanding persistent Qualified Code(s): I48.11 - 

Longstanding persistent atrial fibrillation





Congestive heart failure (CHF)


Qualifiers:


 Heart failure type: right-sided Heart failure chronicity: unspecified Qualified

 Code(s): I50.810 - Right heart failure, unspecified








- Discharge Information


*PRESCRIPTION DRUG MONITORING PROGRAM REVIEWED*: Yes


*COPY OF PRESCRIPTION DRUG MONITORING REPORT IN PATIENT HENNA: No


Instructions:  Heart Failure, Self Care, Easy-to-Read, Hypotension, 

Easy-to-Read, Dehydration, Adult, Easy-to-Read, Heart Failure, Self Care, Atrial

 Fibrillation, Easy-to-Read, Hypotension


Referrals: 


PCP,None [Primary Care Provider] - 


Forms:  ED Department Discharge


Care Plan Goals: 


Please take your pain medication only as prescribed.  Overuse of the pain 

medication can severely affect your blood pressure, mentation, and can even be 

deadly.  It was noted that your prescription for Lortab 10 mg is missing quite a

few tablets indicating that there is significant overuse of this chronically 

prescribed medication.  This is likely what contributed to your unresponsiveness

that prompted you to be brought into the emergency room by EMS.





Sepsis Event Note (ED)





- Evaluation


Sepsis Screening Result: No Definite Risk





- Focused Exam


Vital Signs: 


                                   Vital Signs











  Temp Pulse Resp BP Pulse Ox


 


 10/15/20 06:11   66  14  71/41 L  99


 


 10/15/20 05:41   62  16  70/43 L  99


 


 10/15/20 05:07   67  15  76/45 L  99


 


 10/15/20 04:52   66  20  66/38 L  97


 


 10/15/20 04:37   75  16  80/51 L  97


 


 10/15/20 04:22   61  13  67/38 L 


 


 10/15/20 03:59   68  21 H  82/38 L  100


 


 10/15/20 03:54   74  14  76/44 L  99


 


 10/15/20 03:42   75  16  73/28 L 


 


 10/15/20 03:35   73  18  90/44 L 


 


 10/15/20 03:31   73  17  82/37 L 


 


 10/15/20 03:27   65  17  91/64 


 


 10/15/20 03:21   69  14  71/40 L  90 L


 


 10/15/20 03:16   70  14  75/48 L 


 


 10/15/20 03:11  36.2 C  60  21 H  68/41 L  94 L


 


 10/15/20 03:05   76  21 H  84/47 L 


 


 10/15/20 03:02   64  11 L  80/44 L 


 


 10/15/20 02:56   69  19  83/46 L  91 L


 


 10/15/20 02:51   68  19  79/46 L 


 


 10/15/20 02:44   69  18  74/47 L  97


 


 10/15/20 02:41   73  18  87/48 L  89 L


 


 10/15/20 02:39   69  17  87/48 L  92 L


 


 10/15/20 02:34   753 H  21 H  81/52 L  96


 


 10/15/20 02:29   73  19  72/44 L  96


 


 10/15/20 02:23  35.9 C L  64  24 H  68/45 L  94 L


 


 10/15/20 02:19   73  27 H  81/46 L  94 L


 


 10/15/20 02:13   72  20  82/55 L  93 L














- Problem List & Annotations


(1) Altered mental status, unspecified


SNOMED Code(s): 909990343


   Code(s): R41.82 - ALTERED MENTAL STATUS, UNSPECIFIED   Status: Acute   

Priority: High   Current Visit: Yes   


Qualifiers: 


   Altered mental status type: unspecified   Qualified Code(s): R41.82 - Altered

mental status, unspecified   





(2) Atrial fibrillation


SNOMED Code(s): 73370074


   Code(s): I48.91 - UNSPECIFIED ATRIAL FIBRILLATION   Status: Chronic   

Priority: Medium   Current Visit: Yes   


Qualifiers: 


   Atrial fibrillation type: longstanding persistent   Qualified Code(s): I48.11

- Longstanding persistent atrial fibrillation   





(3) Chronic prescription opiate use


SNOMED Code(s): 704612587


   Code(s): Z79.891 - LONG TERM (CURRENT) USE OF OPIATE ANALGESIC   Status: 

Chronic   Priority: High   Current Visit: Yes   





(4) Congestive heart failure (CHF)


SNOMED Code(s): 26060620


   Code(s): I50.9 - HEART FAILURE, UNSPECIFIED   Status: Chronic   Priority: 

Medium   Current Visit: Yes   


Qualifiers: 


   Heart failure type: right-sided   Heart failure chronicity: unspecified   

Qualified Code(s): I50.810 - Right heart failure, unspecified   





(5) Dysconjugate gaze


SNOMED Code(s): 685364047


   Code(s): H51.8 - OTHER SPECIFIED DISORDERS OF BINOCULAR MOVEMENT   Status: 

Chronic   Priority: Medium   Current Visit: Yes   





(6) Hypocalcemia


SNOMED Code(s): 5406152


   Code(s): E83.51 - HYPOCALCEMIA   Status: Acute   Priority: Medium   Current 

Visit: Yes   





(7) Hypotension


SNOMED Code(s): 20720966


   Code(s): I95.9 - HYPOTENSION, UNSPECIFIED   Status: Acute   Priority: Medium 

 Current Visit: Yes   


Qualifiers: 


   Hypotension type: hypotension due to drug   Qualified Code(s): I95.2 - 

Hypotension due to drugs   





(8) Peripheral edema


SNOMED Code(s): 705112519


   Code(s): R60.9 - EDEMA, UNSPECIFIED   Status: Chronic   Priority: Medium   

Current Visit: Yes   





- Problem List Review


Problem List Initiated/Reviewed/Updated: Yes





- My Orders


Last 24 Hours: 


My Active Orders





10/15/20 02:15


DRUG SCREEN, URINE [URCHEM] Stat 


UA W/MICROSCOPIC [URIN] Stat 


EKG 12 Lead [EK] Routine 





10/15/20 02:18


EKG Documentation Completion [RC] ASDIRECTED 





10/15/20 03:30


Sodium Chloride 0.9% [Normal Saline] 1,000 ml IV ASDIRECTED 














- Assessment/Plan


Last 24 Hours: 


My Active Orders





10/15/20 02:15


DRUG SCREEN, URINE [URCHEM] Stat 


UA W/MICROSCOPIC [URIN] Stat 


EKG 12 Lead [EK] Routine 





10/15/20 02:18


EKG Documentation Completion [RC] ASDIRECTED 





10/15/20 03:30


Sodium Chloride 0.9% [Normal Saline] 1,000 ml IV ASDIRECTED

## 2020-10-15 NOTE — CRLCT
INDICATION:



Altered mental status. Unconscious.



TECHNIQUE:



CT head without contrast.



COMPARISON:



July 29, 2019. 



FINDINGS:



CSF spaces: Within normal limits for age.  



Brain parenchyma and extra-axial spaces: The gray-white differentiation is 

normal.  No sign of mass, hemorrhage, or midline shift. No extra-axial 

fluid collection.  



Skull base and calvarium: The visualized paranasal sinuses and mastoid air 

cells demonstrate no acute or significant findings.  The visualized orbits 

are grossly unremarkable.  No skull fractures.  



IMPRESSION:



Unremarkable noncontrast head CT.



Dictated by Paco Muse MD @ 10/15/2020 2:57:46 AM



Please note that all CT scans at this facility use dose modulation, 

iterative reconstruction, and/or weight-based dosing when appropriate to 

reduce radiation dose to as low as reasonably achievable.



Dictated by: Paco Muse MD @ 10/15/2020 02:57:51



(Electronically Signed)

## 2022-04-07 ENCOUNTER — HOSPITAL ENCOUNTER (EMERGENCY)
Dept: HOSPITAL 11 - JP.ED | Age: 67
Discharge: HOME | End: 2022-04-07
Payer: MEDICARE

## 2022-04-07 VITALS — DIASTOLIC BLOOD PRESSURE: 97 MMHG | HEART RATE: 98 BPM | SYSTOLIC BLOOD PRESSURE: 139 MMHG

## 2022-04-07 DIAGNOSIS — Z79.899: ICD-10-CM

## 2022-04-07 DIAGNOSIS — I10: ICD-10-CM

## 2022-04-07 DIAGNOSIS — E66.9: ICD-10-CM

## 2022-04-07 DIAGNOSIS — R31.9: Primary | ICD-10-CM

## 2022-05-01 ENCOUNTER — HOSPITAL ENCOUNTER (EMERGENCY)
Dept: HOSPITAL 11 - JP.ED | Age: 67
Discharge: HOME | End: 2022-05-01
Payer: MEDICARE

## 2022-05-01 VITALS — HEART RATE: 93 BPM | SYSTOLIC BLOOD PRESSURE: 174 MMHG | DIASTOLIC BLOOD PRESSURE: 113 MMHG

## 2022-05-01 DIAGNOSIS — R10.84: Primary | ICD-10-CM

## 2022-05-01 DIAGNOSIS — Z79.899: ICD-10-CM

## 2022-05-01 DIAGNOSIS — Z72.0: ICD-10-CM

## 2022-05-01 DIAGNOSIS — R10.12: ICD-10-CM

## 2022-05-01 DIAGNOSIS — E66.9: ICD-10-CM

## 2022-05-01 DIAGNOSIS — I10: ICD-10-CM

## 2022-11-05 ENCOUNTER — HOSPITAL ENCOUNTER (EMERGENCY)
Dept: HOSPITAL 11 - JP.ED | Age: 67
Discharge: HOME | End: 2022-11-05
Payer: MEDICARE

## 2022-11-05 VITALS — HEART RATE: 85 BPM | SYSTOLIC BLOOD PRESSURE: 147 MMHG | DIASTOLIC BLOOD PRESSURE: 112 MMHG

## 2022-11-05 VITALS — DIASTOLIC BLOOD PRESSURE: 100 MMHG | SYSTOLIC BLOOD PRESSURE: 146 MMHG | HEART RATE: 130 BPM

## 2022-11-05 DIAGNOSIS — K58.9: Primary | ICD-10-CM

## 2022-11-05 DIAGNOSIS — Z79.899: ICD-10-CM

## 2022-11-05 DIAGNOSIS — E66.9: ICD-10-CM

## 2022-11-05 DIAGNOSIS — M19.90: ICD-10-CM

## 2022-11-05 DIAGNOSIS — I10: ICD-10-CM

## 2022-11-05 DIAGNOSIS — T83.098A: Primary | ICD-10-CM

## 2022-11-05 DIAGNOSIS — F17.210: ICD-10-CM

## 2022-11-05 DIAGNOSIS — Z72.0: ICD-10-CM

## 2022-11-05 PROCEDURE — 99283 EMERGENCY DEPT VISIT LOW MDM: CPT

## 2022-11-05 PROCEDURE — 96372 THER/PROPH/DIAG INJ SC/IM: CPT

## 2022-12-16 ENCOUNTER — HOSPITAL ENCOUNTER (EMERGENCY)
Dept: HOSPITAL 11 - JP.ED | Age: 67
Discharge: HOME | End: 2022-12-16
Payer: MEDICARE

## 2022-12-16 VITALS — SYSTOLIC BLOOD PRESSURE: 116 MMHG | DIASTOLIC BLOOD PRESSURE: 92 MMHG | HEART RATE: 91 BPM

## 2022-12-16 DIAGNOSIS — I50.9: ICD-10-CM

## 2022-12-16 DIAGNOSIS — I11.0: Primary | ICD-10-CM

## 2022-12-16 DIAGNOSIS — E66.9: ICD-10-CM

## 2022-12-16 DIAGNOSIS — Z79.899: ICD-10-CM

## 2022-12-16 PROCEDURE — 99284 EMERGENCY DEPT VISIT MOD MDM: CPT

## 2022-12-16 PROCEDURE — 85025 COMPLETE CBC W/AUTO DIFF WBC: CPT

## 2022-12-16 PROCEDURE — 36415 COLL VENOUS BLD VENIPUNCTURE: CPT

## 2022-12-16 PROCEDURE — 83880 ASSAY OF NATRIURETIC PEPTIDE: CPT

## 2022-12-16 PROCEDURE — 80048 BASIC METABOLIC PNL TOTAL CA: CPT

## 2023-03-13 ENCOUNTER — HOSPITAL ENCOUNTER (EMERGENCY)
Dept: HOSPITAL 11 - JP.ED | Age: 68
LOS: 1 days | Discharge: HOME | End: 2023-03-14
Payer: MEDICARE

## 2023-03-13 DIAGNOSIS — Z79.899: ICD-10-CM

## 2023-03-13 DIAGNOSIS — E66.9: ICD-10-CM

## 2023-03-13 DIAGNOSIS — I50.9: ICD-10-CM

## 2023-03-13 DIAGNOSIS — T83.098A: Primary | ICD-10-CM

## 2023-03-13 DIAGNOSIS — M19.90: ICD-10-CM

## 2023-03-13 DIAGNOSIS — Z72.0: ICD-10-CM

## 2023-03-13 DIAGNOSIS — I11.0: ICD-10-CM

## 2023-03-13 PROCEDURE — 51702 INSERT TEMP BLADDER CATH: CPT

## 2023-03-13 PROCEDURE — 99283 EMERGENCY DEPT VISIT LOW MDM: CPT

## 2023-03-13 PROCEDURE — 96372 THER/PROPH/DIAG INJ SC/IM: CPT

## 2023-03-14 VITALS — SYSTOLIC BLOOD PRESSURE: 145 MMHG | HEART RATE: 58 BPM | DIASTOLIC BLOOD PRESSURE: 105 MMHG

## 2023-03-15 ENCOUNTER — HOSPITAL ENCOUNTER (EMERGENCY)
Dept: HOSPITAL 11 - JP.ED | Age: 68
Discharge: HOME | End: 2023-03-15
Payer: MEDICARE

## 2023-03-15 VITALS — DIASTOLIC BLOOD PRESSURE: 103 MMHG | SYSTOLIC BLOOD PRESSURE: 134 MMHG | HEART RATE: 55 BPM

## 2023-03-15 DIAGNOSIS — G89.4: ICD-10-CM

## 2023-03-15 DIAGNOSIS — E66.9: ICD-10-CM

## 2023-03-15 DIAGNOSIS — I50.9: ICD-10-CM

## 2023-03-15 DIAGNOSIS — I11.0: ICD-10-CM

## 2023-03-15 DIAGNOSIS — T83.091A: Primary | ICD-10-CM

## 2023-03-15 PROCEDURE — 99283 EMERGENCY DEPT VISIT LOW MDM: CPT

## 2023-04-14 ENCOUNTER — HOSPITAL ENCOUNTER (INPATIENT)
Dept: HOSPITAL 11 - JP.ED | Age: 68
LOS: 7 days | Discharge: HOME | DRG: 291 | End: 2023-04-21
Attending: INTERNAL MEDICINE | Admitting: INTERNAL MEDICINE
Payer: MEDICARE

## 2023-04-14 DIAGNOSIS — J44.9: ICD-10-CM

## 2023-04-14 DIAGNOSIS — C67.9: ICD-10-CM

## 2023-04-14 DIAGNOSIS — E55.9: ICD-10-CM

## 2023-04-14 DIAGNOSIS — M19.90: ICD-10-CM

## 2023-04-14 DIAGNOSIS — G89.29: ICD-10-CM

## 2023-04-14 DIAGNOSIS — Z20.822: ICD-10-CM

## 2023-04-14 DIAGNOSIS — H54.7: ICD-10-CM

## 2023-04-14 DIAGNOSIS — Z79.899: ICD-10-CM

## 2023-04-14 DIAGNOSIS — I50.23: ICD-10-CM

## 2023-04-14 DIAGNOSIS — F17.200: ICD-10-CM

## 2023-04-14 DIAGNOSIS — G47.33: ICD-10-CM

## 2023-04-14 DIAGNOSIS — I11.0: Primary | ICD-10-CM

## 2023-04-14 DIAGNOSIS — M54.2: ICD-10-CM

## 2023-04-14 DIAGNOSIS — I48.11: ICD-10-CM

## 2023-04-14 DIAGNOSIS — I48.91: ICD-10-CM

## 2023-04-14 DIAGNOSIS — E53.8: ICD-10-CM

## 2023-04-14 LAB — SARS-COV-2 RNA RESP QL NAA+PROBE: NEGATIVE

## 2023-04-14 RX ADMIN — POTASSIUM CHLORIDE SCH MEQ: 1500 TABLET, EXTENDED RELEASE ORAL at 20:10

## 2023-04-14 RX ADMIN — BUMETANIDE SCH MG: 0.25 INJECTION INTRAMUSCULAR; INTRAVENOUS at 20:06

## 2023-04-14 RX ADMIN — ALBUTEROL SULFATE SCH PUFF: 90 INHALANT RESPIRATORY (INHALATION) at 20:09

## 2023-04-14 RX ADMIN — ALBUTEROL SULFATE SCH PUFF: 90 INHALANT RESPIRATORY (INHALATION) at 17:50

## 2023-04-15 RX ADMIN — POTASSIUM CHLORIDE SCH MEQ: 1500 TABLET, EXTENDED RELEASE ORAL at 08:46

## 2023-04-15 RX ADMIN — BUMETANIDE SCH MG: 0.25 INJECTION INTRAMUSCULAR; INTRAVENOUS at 08:45

## 2023-04-15 RX ADMIN — POTASSIUM CHLORIDE SCH MEQ: 1500 TABLET, EXTENDED RELEASE ORAL at 21:32

## 2023-04-15 RX ADMIN — ALFUZOSIN HCL SCH MG: 10 TABLET, EXTENDED RELEASE ORAL at 08:47

## 2023-04-15 RX ADMIN — ALBUTEROL SULFATE SCH PUFF: 90 INHALANT RESPIRATORY (INHALATION) at 21:32

## 2023-04-15 RX ADMIN — BUMETANIDE SCH MG: 0.25 INJECTION INTRAMUSCULAR; INTRAVENOUS at 19:32

## 2023-04-15 RX ADMIN — ALBUTEROL SULFATE SCH PUFF: 90 INHALANT RESPIRATORY (INHALATION) at 14:37

## 2023-04-15 RX ADMIN — ALBUTEROL SULFATE SCH PUFF: 90 INHALANT RESPIRATORY (INHALATION) at 07:37

## 2023-04-15 RX ADMIN — ALBUTEROL SULFATE SCH PUFF: 90 INHALANT RESPIRATORY (INHALATION) at 10:31

## 2023-04-16 RX ADMIN — BUMETANIDE SCH MG: 0.25 INJECTION INTRAMUSCULAR; INTRAVENOUS at 10:04

## 2023-04-16 RX ADMIN — ALFUZOSIN HCL SCH MG: 10 TABLET, EXTENDED RELEASE ORAL at 10:07

## 2023-04-16 RX ADMIN — POTASSIUM CHLORIDE SCH MEQ: 1500 TABLET, EXTENDED RELEASE ORAL at 10:06

## 2023-04-16 RX ADMIN — POTASSIUM CHLORIDE SCH MEQ: 1500 TABLET, EXTENDED RELEASE ORAL at 21:52

## 2023-04-16 RX ADMIN — ALBUTEROL SULFATE SCH PUFF: 90 INHALANT RESPIRATORY (INHALATION) at 07:21

## 2023-04-16 RX ADMIN — ALBUTEROL SULFATE SCH PUFF: 90 INHALANT RESPIRATORY (INHALATION) at 14:21

## 2023-04-16 RX ADMIN — BUMETANIDE SCH MG: 0.25 INJECTION INTRAMUSCULAR; INTRAVENOUS at 19:49

## 2023-04-16 RX ADMIN — ALBUTEROL SULFATE SCH PUFF: 90 INHALANT RESPIRATORY (INHALATION) at 21:51

## 2023-04-16 RX ADMIN — ALBUTEROL SULFATE SCH PUFF: 90 INHALANT RESPIRATORY (INHALATION) at 10:35

## 2023-04-17 RX ADMIN — BUMETANIDE SCH MG: 0.25 INJECTION INTRAMUSCULAR; INTRAVENOUS at 08:04

## 2023-04-17 RX ADMIN — POTASSIUM CHLORIDE SCH MEQ: 1500 TABLET, EXTENDED RELEASE ORAL at 20:49

## 2023-04-17 RX ADMIN — ALFUZOSIN HCL SCH MG: 10 TABLET, EXTENDED RELEASE ORAL at 08:27

## 2023-04-17 RX ADMIN — ALBUTEROL SULFATE SCH PUFF: 90 INHALANT RESPIRATORY (INHALATION) at 11:52

## 2023-04-17 RX ADMIN — ALBUTEROL SULFATE SCH PUFF: 90 INHALANT RESPIRATORY (INHALATION) at 08:01

## 2023-04-17 RX ADMIN — POTASSIUM CHLORIDE SCH MEQ: 1500 TABLET, EXTENDED RELEASE ORAL at 08:27

## 2023-04-17 RX ADMIN — POTASSIUM CHLORIDE SCH MEQ: 1500 TABLET, EXTENDED RELEASE ORAL at 20:41

## 2023-04-17 RX ADMIN — BUMETANIDE SCH MG: 0.25 INJECTION INTRAMUSCULAR; INTRAVENOUS at 15:25

## 2023-04-17 RX ADMIN — ALBUTEROL SULFATE SCH PUFF: 90 INHALANT RESPIRATORY (INHALATION) at 15:25

## 2023-04-17 RX ADMIN — ALBUTEROL SULFATE SCH PUFF: 90 INHALANT RESPIRATORY (INHALATION) at 20:42

## 2023-04-18 RX ADMIN — ALBUTEROL SULFATE SCH PUFF: 90 INHALANT RESPIRATORY (INHALATION) at 14:28

## 2023-04-18 RX ADMIN — ALFUZOSIN HCL SCH MG: 10 TABLET, EXTENDED RELEASE ORAL at 09:02

## 2023-04-18 RX ADMIN — ALBUTEROL SULFATE SCH PUFF: 90 INHALANT RESPIRATORY (INHALATION) at 07:25

## 2023-04-18 RX ADMIN — POTASSIUM CHLORIDE SCH MEQ: 750 CAPSULE, EXTENDED RELEASE ORAL at 16:08

## 2023-04-18 RX ADMIN — ALBUTEROL SULFATE SCH PUFF: 90 INHALANT RESPIRATORY (INHALATION) at 20:49

## 2023-04-18 RX ADMIN — BUMETANIDE SCH MG: 0.25 INJECTION INTRAMUSCULAR; INTRAVENOUS at 16:08

## 2023-04-18 RX ADMIN — ALBUTEROL SULFATE SCH PUFF: 90 INHALANT RESPIRATORY (INHALATION) at 10:45

## 2023-04-18 RX ADMIN — ALBUTEROL SULFATE SCH PUFF: 90 INHALANT RESPIRATORY (INHALATION) at 14:55

## 2023-04-18 RX ADMIN — POTASSIUM CHLORIDE SCH MEQ: 1500 TABLET, EXTENDED RELEASE ORAL at 09:01

## 2023-04-18 RX ADMIN — BUMETANIDE SCH MG: 0.25 INJECTION INTRAMUSCULAR; INTRAVENOUS at 05:55

## 2023-04-19 RX ADMIN — BUMETANIDE SCH MG: 0.25 INJECTION INTRAMUSCULAR; INTRAVENOUS at 17:21

## 2023-04-19 RX ADMIN — ALBUTEROL SULFATE SCH PUFF: 90 INHALANT RESPIRATORY (INHALATION) at 10:35

## 2023-04-19 RX ADMIN — ALBUTEROL SULFATE SCH PUFF: 90 INHALANT RESPIRATORY (INHALATION) at 07:20

## 2023-04-19 RX ADMIN — POTASSIUM CHLORIDE SCH MEQ: 750 CAPSULE, EXTENDED RELEASE ORAL at 08:09

## 2023-04-19 RX ADMIN — BUMETANIDE SCH MG: 0.25 INJECTION INTRAMUSCULAR; INTRAVENOUS at 10:24

## 2023-04-19 RX ADMIN — ALBUTEROL SULFATE SCH PUFF: 90 INHALANT RESPIRATORY (INHALATION) at 14:16

## 2023-04-19 RX ADMIN — ALFUZOSIN HCL SCH MG: 10 TABLET, EXTENDED RELEASE ORAL at 08:13

## 2023-04-19 RX ADMIN — ALBUTEROL SULFATE SCH PUFF: 90 INHALANT RESPIRATORY (INHALATION) at 20:37

## 2023-04-19 RX ADMIN — POTASSIUM CHLORIDE SCH MEQ: 750 CAPSULE, EXTENDED RELEASE ORAL at 17:17

## 2023-04-20 RX ADMIN — ALBUTEROL SULFATE SCH PUFF: 90 INHALANT RESPIRATORY (INHALATION) at 14:40

## 2023-04-20 RX ADMIN — ALBUTEROL SULFATE SCH PUFF: 90 INHALANT RESPIRATORY (INHALATION) at 06:58

## 2023-04-20 RX ADMIN — BUMETANIDE SCH MG: 0.25 INJECTION INTRAMUSCULAR; INTRAVENOUS at 15:33

## 2023-04-20 RX ADMIN — POTASSIUM CHLORIDE SCH MEQ: 750 CAPSULE, EXTENDED RELEASE ORAL at 16:31

## 2023-04-20 RX ADMIN — BUMETANIDE SCH MG: 0.25 INJECTION INTRAMUSCULAR; INTRAVENOUS at 06:07

## 2023-04-20 RX ADMIN — ALFUZOSIN HCL SCH MG: 10 TABLET, EXTENDED RELEASE ORAL at 10:55

## 2023-04-20 RX ADMIN — ALBUTEROL SULFATE SCH PUFF: 90 INHALANT RESPIRATORY (INHALATION) at 10:49

## 2023-04-20 RX ADMIN — POTASSIUM CHLORIDE SCH MEQ: 750 CAPSULE, EXTENDED RELEASE ORAL at 10:48

## 2023-04-20 RX ADMIN — ALBUTEROL SULFATE SCH PUFF: 90 INHALANT RESPIRATORY (INHALATION) at 20:21

## 2023-04-21 VITALS — DIASTOLIC BLOOD PRESSURE: 72 MMHG | HEART RATE: 81 BPM | SYSTOLIC BLOOD PRESSURE: 99 MMHG

## 2023-04-21 RX ADMIN — ALBUTEROL SULFATE SCH PUFF: 90 INHALANT RESPIRATORY (INHALATION) at 07:22

## 2023-04-21 RX ADMIN — ALBUTEROL SULFATE SCH PUFF: 90 INHALANT RESPIRATORY (INHALATION) at 10:39

## 2023-04-21 RX ADMIN — POTASSIUM CHLORIDE SCH MEQ: 750 CAPSULE, EXTENDED RELEASE ORAL at 08:26

## 2023-04-21 RX ADMIN — ALFUZOSIN HCL SCH MG: 10 TABLET, EXTENDED RELEASE ORAL at 08:26

## 2023-05-22 ENCOUNTER — HOSPITAL ENCOUNTER (EMERGENCY)
Dept: HOSPITAL 11 - JP.ED | Age: 68
LOS: 1 days | Discharge: HOME | End: 2023-05-23
Payer: MEDICARE

## 2023-05-22 VITALS — HEART RATE: 78 BPM | SYSTOLIC BLOOD PRESSURE: 98 MMHG | DIASTOLIC BLOOD PRESSURE: 64 MMHG

## 2023-05-22 DIAGNOSIS — I11.0: ICD-10-CM

## 2023-05-22 DIAGNOSIS — I50.9: ICD-10-CM

## 2023-05-22 DIAGNOSIS — J44.9: ICD-10-CM
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
## 2023-05-22 DIAGNOSIS — Z79.899: ICD-10-CM

## 2023-05-22 DIAGNOSIS — E66.9: ICD-10-CM

## 2023-05-22 DIAGNOSIS — I48.91: ICD-10-CM

## 2023-05-22 DIAGNOSIS — Z20.822: ICD-10-CM

## 2023-05-22 DIAGNOSIS — Z79.51: ICD-10-CM

## 2023-05-22 DIAGNOSIS — S81.812A: Primary | ICD-10-CM

## 2023-05-22 LAB
ALBUMIN SERPL-MCNC: 2.7 G/DL (ref 3.4–5)
ALBUMIN/GLOB SERPL: 0.8 {RATIO} (ref 1.2–2.2)
ALP SERPL-CCNC: 123 U/L (ref 46–116)
ALT SERPL-CCNC: 16 U/L (ref 12–78)
ANION GAP SERPL CALC-SCNC: 11.6 MMOL/L (ref 5–14)
APPEARANCE UR: CLEAR
AST SERPL-CCNC: 14 U/L (ref 15–37)
BACTERIA URNS QL MICRO: (no result)
BASOPHILS # BLD AUTO: 0.04 K/UL (ref 0–0.1)
BASOPHILS NFR BLD AUTO: 1 % (ref 0.1–1.3)
BILIRUB SERPL-MCNC: 0.5 MG/DL (ref 0.2–1)
BILIRUB UR STRIP-MCNC: NEGATIVE MG/DL
BUN SERPL-MCNC: 17 MG/DL (ref 7–18)
CALCIUM SERPL-MCNC: 8.1 MG/DL (ref 8.5–10.1)
CHLORIDE SERPL-SCNC: 103 MMOL/L (ref 100–108)
CO2 SERPL-SCNC: 27 MMOL/L (ref 21–32)
COLOR UR: YELLOW
CREAT CL 24H UR+SERPL-VRATE: 62.44 ML/MIN
CREAT SERPL-MCNC: 1.1 MG/DL (ref 0.8–1.3)
EOSINOPHIL # BLD AUTO: 0.07 K/UL (ref 0–0.4)
EOSINOPHIL NFR BLD AUTO: 1.7 % (ref 0–5.4)
EPI CELLS #/AREA URNS HPF: (no result) /[HPF]
GLOBULIN SER-MCNC: 3.6 G/DL (ref 2.3–3.5)
GLUCOSE SERPL-MCNC: 90 MG/DL (ref 74–106)
GLUCOSE UR STRIP-MCNC: NEGATIVE MG/DL
HCT VFR BLD AUTO: 32 % (ref 38.4–49.7)
HGB BLD-MCNC: 9.7 G/DL (ref 12.9–16.9)
IMM GRANULOCYTES # BLD: 0.01 K/UL (ref 0–0.23)
IMM GRANULOCYTES NFR BLD: 0.2 % (ref 0–0.7)
KETONES UR STRIP-MCNC: NEGATIVE MG/DL
LYMPHOCYTES # BLD AUTO: 1.03 K/UL (ref 0.8–3.3)
LYMPHOCYTES NFR BLD AUTO: 25.7 % (ref 11.4–47.7)
MCH RBC QN AUTO: 21.8 PG (ref 31.6–35.5)
MCHC RBC AUTO-ENTMCNC: 30.3 G/DL (ref 31.6–35.5)
MCHC RBC AUTO-ENTMCNC: 71.9 FL (ref 81.4–99)
MONOCYTES # BLD AUTO: 0.64 K/UL (ref 0.2–0.9)
MONOCYTES NFR BLD AUTO: 16 % (ref 3.3–12.6)
MUCOUS THREADS URNS QL MICRO: (no result)
NEUTROPHILS # BLD AUTO: 2.22 K/UL (ref 1–7.6)
NEUTROPHILS NFR BLD AUTO: 55.4 % (ref 40–78.1)
NITRITE UR QL: NEGATIVE
PH UR STRIP: 5 [PH] (ref 5–8)
PLATELET # BLD AUTO: 182 K/UL (ref 130–375)
POTASSIUM SERPL-SCNC: 3.6 MMOL/L (ref 3.6–5.2)
PROT SERPL-MCNC: 6.3 G/DL (ref 6.4–8.2)
PROT UR STRIP-MCNC: (no result) MG/DL
RBC # BLD AUTO: 4.45 M/UL (ref 4.14–5.76)
RBC # URNS HPF: (no result) /ML (ref 0–5)
RBC UR QL: (no result)
SODIUM SERPL-SCNC: 138 MMOL/L (ref 140–148)
SP GR UR STRIP: 1.02 (ref 1.01–1.03)
UROBILINOGEN UR STRIP-ACNC: 1 EU/DL (ref 0.2–1)
WBC # BLD AUTO: 4 K/UL (ref 3.2–11)
WBC UR QL: (no result) (ref 0–5)

## 2023-05-22 PROCEDURE — 99283 EMERGENCY DEPT VISIT LOW MDM: CPT

## 2023-05-22 PROCEDURE — 80053 COMPREHEN METABOLIC PANEL: CPT

## 2023-05-22 PROCEDURE — 85025 COMPLETE CBC W/AUTO DIFF WBC: CPT

## 2023-05-22 PROCEDURE — U0002 COVID-19 LAB TEST NON-CDC: HCPCS

## 2023-05-22 PROCEDURE — 36415 COLL VENOUS BLD VENIPUNCTURE: CPT

## 2023-05-22 PROCEDURE — 81001 URINALYSIS AUTO W/SCOPE: CPT

## 2023-06-07 ENCOUNTER — HOSPITAL ENCOUNTER (EMERGENCY)
Dept: HOSPITAL 11 - JP.ED | Age: 68
Discharge: HOME | End: 2023-06-07
Payer: MEDICARE

## 2023-06-07 VITALS — DIASTOLIC BLOOD PRESSURE: 104 MMHG | SYSTOLIC BLOOD PRESSURE: 153 MMHG | HEART RATE: 80 BPM

## 2023-06-07 DIAGNOSIS — I50.9: ICD-10-CM

## 2023-06-07 DIAGNOSIS — M79.672: ICD-10-CM

## 2023-06-07 DIAGNOSIS — M79.671: ICD-10-CM

## 2023-06-07 DIAGNOSIS — Z76.0: Primary | ICD-10-CM

## 2023-06-07 DIAGNOSIS — I48.91: ICD-10-CM

## 2023-06-07 DIAGNOSIS — I11.0: ICD-10-CM

## 2023-06-07 DIAGNOSIS — E66.9: ICD-10-CM

## 2023-06-07 DIAGNOSIS — Z79.899: ICD-10-CM

## 2023-06-07 DIAGNOSIS — M25.511: ICD-10-CM

## 2023-06-07 DIAGNOSIS — J44.9: ICD-10-CM

## 2023-06-07 DIAGNOSIS — M25.512: ICD-10-CM

## 2023-09-03 ENCOUNTER — HOSPITAL ENCOUNTER (EMERGENCY)
Dept: HOSPITAL 11 - JP.ED | Age: 68
Discharge: HOME | End: 2023-09-03
Payer: MEDICARE

## 2023-09-03 VITALS — DIASTOLIC BLOOD PRESSURE: 83 MMHG | SYSTOLIC BLOOD PRESSURE: 109 MMHG | HEART RATE: 106 BPM

## 2023-09-03 DIAGNOSIS — E88.09: ICD-10-CM

## 2023-09-03 DIAGNOSIS — G47.33: ICD-10-CM

## 2023-09-03 DIAGNOSIS — Z91.148: ICD-10-CM

## 2023-09-03 DIAGNOSIS — R60.0: ICD-10-CM

## 2023-09-03 DIAGNOSIS — N17.9: ICD-10-CM

## 2023-09-03 DIAGNOSIS — Z98.84: ICD-10-CM

## 2023-09-03 DIAGNOSIS — D50.9: ICD-10-CM

## 2023-09-03 DIAGNOSIS — F17.210: Primary | ICD-10-CM

## 2023-09-03 DIAGNOSIS — Z20.822: ICD-10-CM

## 2023-09-03 DIAGNOSIS — F60.9: ICD-10-CM

## 2023-09-03 LAB
ALBUMIN SERPL-MCNC: 2.8 G/DL (ref 3.4–5)
ALBUMIN/GLOB SERPL: 0.8 {RATIO} (ref 1.2–2.2)
ALP SERPL-CCNC: 114 U/L (ref 46–116)
ALT SERPL-CCNC: 23 U/L (ref 12–78)
ANION GAP SERPL CALC-SCNC: 12.9 MMOL/L (ref 5–14)
APTT PPP: 29.2 SEC (ref 21.8–27.3)
AST SERPL-CCNC: 29 U/L (ref 15–37)
BASOPHILS # BLD AUTO: 0.08 K/UL (ref 0–0.1)
BASOPHILS NFR BLD AUTO: 1.8 % (ref 0.1–1.3)
BILIRUB SERPL-MCNC: 0.9 MG/DL (ref 0.2–1)
BUN SERPL-MCNC: 13 MG/DL (ref 7–18)
CALCIUM SERPL-MCNC: 8.6 MG/DL (ref 8.5–10.1)
CHLORIDE SERPL-SCNC: 103 MMOL/L (ref 100–108)
CO2 SERPL-SCNC: 26 MMOL/L (ref 21–32)
CREAT CL 24H UR+SERPL-VRATE: 39.96 ML/MIN
CREAT SERPL-MCNC: 1.7 MG/DL (ref 0.8–1.3)
CRP SERPL-MCNC: 1.2 MG/DL (ref 0–0.3)
EOSINOPHIL # BLD AUTO: 0.04 K/UL (ref 0–0.4)
EOSINOPHIL NFR BLD AUTO: 0.9 % (ref 0–5.4)
GLOBULIN SER-MCNC: 3.6 G/DL (ref 2.3–3.5)
GLUCOSE SERPL-MCNC: 101 MG/DL (ref 74–106)
HCT VFR BLD AUTO: 36.4 % (ref 38.4–49.7)
HGB BLD-MCNC: 11 G/DL (ref 12.9–16.9)
IMM GRANULOCYTES # BLD: 0.04 K/UL (ref 0–0.23)
IMM GRANULOCYTES NFR BLD: 0.9 % (ref 0–0.7)
INR PPP: 1.4
LACTATE SERPL-SCNC: 2.7 MMOL/L (ref 0.4–2)
LYMPHOCYTES # BLD AUTO: 0.94 K/UL (ref 0.8–3.3)
LYMPHOCYTES NFR BLD AUTO: 20.6 % (ref 11.4–47.7)
MCH RBC QN AUTO: 20.4 PG (ref 31.6–35.5)
MCHC RBC AUTO-ENTMCNC: 30.2 G/DL (ref 31.6–35.5)
MCHC RBC AUTO-ENTMCNC: 67.5 FL (ref 81.4–99)
MONOCYTES # BLD AUTO: 0.58 K/UL (ref 0.2–0.9)
MONOCYTES NFR BLD AUTO: 12.7 % (ref 3.3–12.6)
NEUTROPHILS # BLD AUTO: 2.89 K/UL (ref 1–7.6)
NEUTROPHILS NFR BLD AUTO: 63.1 % (ref 40–78.1)
PLATELET # BLD AUTO: 242 K/UL (ref 130–375)
POTASSIUM SERPL-SCNC: 3.9 MMOL/L (ref 3.6–5.2)
PROT SERPL-MCNC: 6.4 G/DL (ref 6.4–8.2)
PROTHROMBIN TIME: 14.1 SEC (ref 9.2–10.6)
RBC # BLD AUTO: 5.39 M/UL (ref 4.14–5.76)
SODIUM SERPL-SCNC: 138 MMOL/L (ref 140–148)
WBC # BLD AUTO: 4.6 K/UL (ref 3.2–11)

## 2023-09-03 PROCEDURE — 83605 ASSAY OF LACTIC ACID: CPT

## 2023-09-03 PROCEDURE — U0002 COVID-19 LAB TEST NON-CDC: HCPCS

## 2023-09-03 PROCEDURE — 87086 URINE CULTURE/COLONY COUNT: CPT

## 2023-09-03 PROCEDURE — 84145 PROCALCITONIN (PCT): CPT

## 2023-09-03 PROCEDURE — 96374 THER/PROPH/DIAG INJ IV PUSH: CPT

## 2023-09-03 PROCEDURE — 80053 COMPREHEN METABOLIC PANEL: CPT

## 2023-09-03 PROCEDURE — 85025 COMPLETE CBC W/AUTO DIFF WBC: CPT

## 2023-09-03 PROCEDURE — 85610 PROTHROMBIN TIME: CPT

## 2023-09-03 PROCEDURE — 96361 HYDRATE IV INFUSION ADD-ON: CPT

## 2023-09-03 PROCEDURE — 74176 CT ABD & PELVIS W/O CONTRAST: CPT

## 2023-09-03 PROCEDURE — 86140 C-REACTIVE PROTEIN: CPT

## 2023-09-03 PROCEDURE — 83735 ASSAY OF MAGNESIUM: CPT

## 2023-09-03 PROCEDURE — 93005 ELECTROCARDIOGRAM TRACING: CPT

## 2023-09-03 PROCEDURE — 36415 COLL VENOUS BLD VENIPUNCTURE: CPT

## 2023-09-03 PROCEDURE — 83690 ASSAY OF LIPASE: CPT

## 2023-09-03 PROCEDURE — 87040 BLOOD CULTURE FOR BACTERIA: CPT

## 2023-09-03 PROCEDURE — 85730 THROMBOPLASTIN TIME PARTIAL: CPT

## 2023-09-03 PROCEDURE — 99284 EMERGENCY DEPT VISIT MOD MDM: CPT
